# Patient Record
(demographics unavailable — no encounter records)

---

## 2025-03-27 NOTE — ASSESSMENT
[Good candidate] : a good candidate. We should proceed with our protocol for evaluation for kidney transplantation. [FreeTextEntry1] : good surgical candidate ct c/a/p with iv contrast small crc resected 2019; no adjuvant ctx required

## 2025-03-27 NOTE — PHYSICAL EXAM
[] : right dorsalis pedis palpable [TextBox_16] : sternotomy scar [TextBox_25] : soft nontender, laparoscopic incisions noted

## 2025-03-27 NOTE — HISTORY OF PRESENT ILLNESS
[TextBox_42] : Mr. CHEPE GODDARD is a 68 year M who presents today for initial/annual evaluation for kidney transplant candidacy  Cause of Kidney Disease: presumed DM2, never had biopsy Referring Nephrologist: Dr. Dickey DIALYSIS Hx: HD 10/2024 MWF, AVF, [id number], UF 2L per session, UOP- a lot, infections- denies, unit- [facility] Listing status: went for evaluation at [place] - but surgeon did not participate in his insurance Date of Last Visit: n/a ABO: O+ pRA: does not know  Most Recent Hospitalizations/Recent Events: 2024- woke up with difficulty breathing and started HD- at [facility] 2 weeks ago had ER visit for bleeding from fistula  MEDICAL Hx: DM 2 dx  on insulin denies retinopathy/neuropathy/gastroparesis/autonomic dysfunction/amputations/ulcers CAD s/p CABG 2019 at [facility] HLD TIA- around  HTN Colon cancer- 2019 s/p resection- found on routine colonscopy COVID/COVID vaccination: no infection, vaccine x 3  No H/o Gout No known h/o kidney stone/ Prostatism/urinary obstruction/ ureter stents/hematuria. No h/o Sleep apnea. No h/o Thyroid disease. No h/o PNA/h/o tuberculosis or hepatitis/active infections/open wounds. No h/o NSAID/pain medication use No known h/o active PVD/DVT/bleeding/falls/seizures/psych issues  Sensitizing Events: Transfusions- yes, during CABG Prior Transplant- no Genetic Testing- Renasight- none   SURGICAL Hx: CABG x  2019 Colon resection - [facility] AVF LUE No history of kidney/ bladder/ prostate surgery.  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  Potential Live donors: none

## 2025-03-28 NOTE — ASSESSMENT
[Good candidate] : a good candidate. We should proceed with our protocol for evaluation for kidney transplantation. [FreeTextEntry1] : Prior Studies: Cleared by- Cardiology- ECHO Stress Cath   Cancer Screen- Colonoscopy PSA 0.92 2/19/2025  Pap   Imaging- CT a/p CXR   Consultants: Primary MD- Dr. Radha Quiroga [phone] Cardiologist- Dr. Bonds  [phone]

## 2025-03-28 NOTE — ASSESSMENT
[Good candidate] : a good candidate. We should proceed with our protocol for evaluation for kidney transplantation. [FreeTextEntry1] : Prior Studies: Cleared by- Cardiology- ECHO Stress Cath   Cancer Screen- Colonoscopy PSA 0.92 2/19/2025  Pap   Imaging- CT a/p CXR   Consultants: Primary MD- Dr. Radha Quiroga [phone] Cardiologist- Dr. Bonds  [phone]

## 2025-03-28 NOTE — HISTORY OF PRESENT ILLNESS
[Diabetes Mellitus] : Diabetes Mellitus [Not Working] : Not working [70: Cares for self; unalbe to carry on normal activity or do active work.] : 70: Cares for self; unable to carry on normal activity or do active work. [TextBox_42] : Mr. CHEPE GODDARD is a 68 year M who presents today for initial/annual evaluation for kidney transplant candidacy   Cause of Kidney Disease: presumed DM2, never had biopsy Referring Nephrologist: Dr. Nikunj Dickey DIALYSIS Hx: HD 10/2024 MWF, AVF, [id number], UF 2L per session, UOP- a lot, infections- denies, unit- [facility] Listing status: went for evaluation at [place] 2025- but surgeon did not participate in his insurance Date of Last Visit: n/a ABO: O+ pRA: does not know   Most Recent Hospitalizations/Recent Events: 2024- woke up with difficulty breathing and started HD- at [facility] 2 weeks ago had ER visit for bleeding from fistula    MEDICAL Hx: DM 2 dx  on insulin denies retinopathy but noted to have neuropathy, no gastroparesis/autonomic dysfunction/amputations/ulcers CAD s/p CABG 2019 at [facility] HLD TIA- around  HTN Colon cancer- 2019 s/p resection- found on routine colonoscopy COVID/COVID vaccination: no infection, vaccine x 3  No H/o Gout No known h/o kidney stone/ Prostatism/urinary obstruction/ ureter stents/hematuria. No h/o Sleep apnea. No h/o Thyroid disease. No h/o PNA/h/o tuberculosis or hepatitis/active infections/open wounds. No h/o NSAID/pain medication use No known h/o active PVD/DVT/bleeding/falls/seizures/psych issues  Sensitizing Events: Transfusions- yes, during CABG Prior Transplant- no Genetic Testing- Renasit- none  SURGICAL Hx: CABG x  2019 Colon resection 2019- [facility] AVF LUE No history of kidney/ bladder/ prostate surgery.   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  Potential Live donors: none

## 2025-03-28 NOTE — PHYSICAL EXAM
Bedside report received from FLAQUITO LARA   Hemodialysis Treatment to be done @ bedside.  PER ICU STATUS AND ISOLATION COVID+ AND CDIFF  Name and  verified verbally by patient.   Bed weight is 70.5KG  Patient is on 3 Liters O2 via nasal cannula.  Patient is on ROOM AIR  Prescribed Hemodialysis orders are verified in the machine.  Hemodialysis orders are for  1 Liters of fluid removal.  --------------------------------------------------------    Dialysis location: ICU  Duration of Treatment (hrs) 4 Hours  Dialyzer F160  Dialysate Temperature (Centigrade) 35  Fluid Removal (L) 1L  BFR: 400  Tubing Choice Adult  Primary Access Site Central Line  K Dialysate: 3.0 meq  CA Dialysate: 2.50 meq  Glucose 100 mg/L  HCO3 Dialysate: 35    --------------------------------------------------------    Pre- Treatment VS:  BP 95/41   Temp 36.8C   Pulse 88   SpO2 96           Patient has a RIGHT SUBCLAVIAN CVC  Dressing is CLEAN DRY AND INTACT.   No visible S/S of infection noted.  Patient denies pain near insertion site.  No need for dressing change today, date on dressing is 2023    CVC accessed with aseptic technique.   Caps removed.  Arterial and venous lumens aspirated without difficulty.  Patency of both lumens checked with 2 (10) ml normal saline flushes.      Treatment initiated @ 0920  Scheduled completion @ 1320    Per Dr. Giancarlo RAMSAY to remove fluid as long as systolic is above 70    Treatment Completed @ 1320      Blood Retransfused. VSS.   Arterial and venous lumens flushed with 10 ml NS.  A Heparin dwell was administered.  1.6 ARTERIAL AND VENOUS LUMEN  Patient appears to be hemodynamically stable.  Patient is in no pain and denies complaints.  Post HD Report called to Bedside FLAQUITO LARA         BP 93/51   Temp 36.7c   Pulse 85   Resp 18    1.0 liters fluid removed        [General Appearance - In No Acute Distress] : in no acute distress [General Appearance - Alert] : alert [Sclera] : the sclera and conjunctiva were normal [PERRL With Normal Accommodation] : pupils were equal in size, round, and reactive to light [Extraocular Movements] : extraocular movements were intact [Outer Ear] : the ears and nose were normal in appearance [Hearing Threshold Finger Rub Not [place]] : hearing was normal [Examination Of The Oral Cavity] : the lips and gums were normal [Neck Appearance] : the appearance of the neck was normal [Neck Cervical Mass (___cm)] : no neck mass was observed [Jugular Venous Distention Increased] : there was no jugular-venous distention [Respiration, Rhythm And Depth] : normal respiratory rhythm and effort [Auscultation Breath Sounds / Voice Sounds] : lungs were clear to auscultation bilaterally [Heart Rate And Rhythm] : heart rate was normal and rhythm regular [Heart Sounds] : normal S1 and S2 [Heart Sounds Gallop] : no gallops [Murmurs] : no murmurs [Heart Sounds Pericardial Friction Rub] : no pericardial rub [Edema] : there was no peripheral edema [Bowel Sounds] : normal bowel sounds [Abdomen Soft] : soft [Abdomen Tenderness] : non-tender [Abnormal Walk] : normal gait [Nail Clubbing] : no clubbing  or cyanosis of the fingernails [Skin Lesions] : no skin lesions [] : right dorsalis pedis palpable [Cranial Nerves] : cranial nerves 2-12 were intact [No Focal Deficits] : no focal deficits [Oriented To Time, Place, And Person] : oriented to person, place, and time [Impaired Insight] : insight and judgment were intact [Affect] : the affect was normal [FreeTextEntry1] : toe nails with fungal changes, areas on toes with old wounds/scabs, very dry scaly skin

## 2025-03-28 NOTE — PHYSICAL EXAM
[General Appearance - Alert] : alert [General Appearance - In No Acute Distress] : in no acute distress [Sclera] : the sclera and conjunctiva were normal [PERRL With Normal Accommodation] : pupils were equal in size, round, and reactive to light [Extraocular Movements] : extraocular movements were intact [Outer Ear] : the ears and nose were normal in appearance [Hearing Threshold Finger Rub Not [place]] : hearing was normal [Examination Of The Oral Cavity] : the lips and gums were normal [Neck Appearance] : the appearance of the neck was normal [Neck Cervical Mass (___cm)] : no neck mass was observed [Jugular Venous Distention Increased] : there was no jugular-venous distention [Respiration, Rhythm And Depth] : normal respiratory rhythm and effort [Auscultation Breath Sounds / Voice Sounds] : lungs were clear to auscultation bilaterally [Heart Rate And Rhythm] : heart rate was normal and rhythm regular [Heart Sounds] : normal S1 and S2 [Heart Sounds Gallop] : no gallops [Murmurs] : no murmurs [Heart Sounds Pericardial Friction Rub] : no pericardial rub [Edema] : there was no peripheral edema [Bowel Sounds] : normal bowel sounds [Abdomen Soft] : soft [Abdomen Tenderness] : non-tender [Abnormal Walk] : normal gait [Nail Clubbing] : no clubbing  or cyanosis of the fingernails [Skin Lesions] : no skin lesions [] : right dorsalis pedis palpable [Cranial Nerves] : cranial nerves 2-12 were intact [No Focal Deficits] : no focal deficits [Oriented To Time, Place, And Person] : oriented to person, place, and time [Impaired Insight] : insight and judgment were intact [Affect] : the affect was normal [FreeTextEntry1] : toe nails with fungal changes, areas on toes with old wounds/scabs, very dry scaly skin

## 2025-03-28 NOTE — PLAN
[FreeTextEntry1] : 1) ESRD- patient is adjusting to recently starting dialysis- appears to be tolerating it for now 2) DM2- patient currently on insulin, needs to follow up with podiatry due to skin changes noted- discussed at length with hi, 3) CAD s/p CABG- multi vessel disease- needs to be cleared by cardiology, get prior records 4) Kidney transplant evaluation- patient is an acceptable candidate to continue work up for transplant. We discussed at length the benefits of living donation.  -podiatrist -dentist -needs updated colonoscopy and records of prior colon cancer  I have personally discussed the risks and benefits of transplantation and patient attended transplant education class where the following was disclosed:   Reviewed factors affecting survival and morbidity while on dialysis, the transplant wait list and reviewed valorie-operative and long-term risk factors affecting outcome in kidney transplantation.   One year SRTR outcomes for national and [facility] were discussed in regards to patient survival and graft survival after transplantation.   Details of transplant surgery, including complications were discussed. Details of slow and delayed graft function were discussed, referred to as "sleepy kidney." Outcomes of delayed graft function were also discussed. Immunosuppression and complications including infection including life threatening sepsis and opportunistic infections, malignancy and new onset diabetes were discussed.   Benefits of live donor transplantation as well as variability in wait times across regions and multiple listing were discussed. KDPI >85% and PHS high risk criteria donors were discussed. HCV kidney transplantation was discussed.   Patient and accompanying party verbalized understanding of all issues discussed. All questions were addressed.   Will proceed with completing/ updating work up and listing for transplant/ live donor transplant once work up is reviewed and found to be acceptable by multidisciplinary listing committee.

## 2025-03-28 NOTE — CONSULT LETTER
[Consult Letter:] : I had the pleasure of evaluating your patient, [unfilled]. [Please see my note below.] : Please see my note below. [Consult Closing:] : Thank you very much for allowing me to participate in the care of this patient.  If you have any questions, please do not hesitate to contact me. [Sincerely,] : Sincerely, [Dear  ___] : Dear  [unfilled], [FreeTextEntry3] : Aleida Bergeron MD MPH

## 2025-03-28 NOTE — PLAN
[FreeTextEntry1] : 1) ESRD- patient is adjusting to recently starting dialysis- appears to be tolerating it for now 2) DM2- patient currently on insulin, needs to follow up with podiatry due to skin changes noted- discussed at length with hi, 3) CAD s/p CABG- multi vessel disease- needs to be cleared by cardiology, get prior records 4) Kidney transplant evaluation- patient is an acceptable candidate to continue work up for transplant. We discussed at length the benefits of living donation.  -podiatrist -dentist -needs updated colonoscopy and records of prior colon cancer  I have personally discussed the risks and benefits of transplantation and patient attended transplant education class where the following was disclosed:   Reviewed factors affecting survival and morbidity while on dialysis, the transplant wait list and reviewed valorie-operative and long-term risk factors affecting outcome in kidney transplantation.   One year SRTR outcomes for national and [facility] were discussed in regards to patient survival and graft survival after transplantation.   Details of transplant surgery, including complications were discussed. Details of slow and delayed graft function were discussed, referred to as "sleepy kidney." Outcomes of delayed graft function were also discussed. Immunosuppression and complications including infection including life threatening sepsis and opportunistic infections, malignancy and new onset diabetes were discussed.   Benefits of live donor transplantation as well as variability in wait times across regions and multiple listing were discussed. KDPI >85% and PHS high risk criteria donors were discussed. HCV kidney transplantation was discussed.   Patient and accompanying party verbalized understanding of all issues discussed. All questions were addressed.   Will proceed with completing/ updating work up and listing for transplant/ live donor transplant once work up is reviewed and found to be acceptable by multidisciplinary listing committee.

## 2025-03-28 NOTE — HISTORY OF PRESENT ILLNESS
[Diabetes Mellitus] : Diabetes Mellitus [Not Working] : Not working [70: Cares for self; unalbe to carry on normal activity or do active work.] : 70: Cares for self; unable to carry on normal activity or do active work. [TextBox_42] : Mr. CHEPE GODDARD is a 68 year M who presents today for initial/annual evaluation for kidney transplant candidacy   Cause of Kidney Disease: presumed DM2, never had biopsy Referring Nephrologist: Dr. Nikunj Dickey DIALYSIS Hx: HD 10/2024 MWF, AVF, [id number], UF 2L per session, UOP- a lot, infections- denies, unit- [facility] Listing status: went for evaluation at [place] 2025- but surgeon did not participate in his insurance Date of Last Visit: n/a ABO: O+ pRA: does not know   Most Recent Hospitalizations/Recent Events: 2024- woke up with difficulty breathing and started HD- at [facility] 2 weeks ago had ER visit for bleeding from fistula    MEDICAL Hx: DM 2 dx  on insulin denies retinopathy but noted to have neuropathy, no gastroparesis/autonomic dysfunction/amputations/ulcers CAD s/p CABG 2019 at [facility] HLD TIA- around  HTN Colon cancer- 2019 s/p resection- found on routine colonoscopy COVID/COVID vaccination: no infection, vaccine x 3  No H/o Gout No known h/o kidney stone/ Prostatism/urinary obstruction/ ureter stents/hematuria. No h/o Sleep apnea. No h/o Thyroid disease. No h/o PNA/h/o tuberculosis or hepatitis/active infections/open wounds. No h/o NSAID/pain medication use No known h/o active PVD/DVT/bleeding/falls/seizures/psych issues  Sensitizing Events: Transfusions- yes, during CABG Prior Transplant- no Genetic Testing- Renasit- none  SURGICAL Hx: CABG x  2019 Colon resection 2019- [facility] AVF LUE No history of kidney/ bladder/ prostate surgery.   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  Potential Live donors: none

## 2025-04-03 NOTE — CARDIOLOGY SUMMARY
[de-identified] : 4/3/25: NSR, Inf q waves, nonspecific T wave abnormalities.  [de-identified] : 2-D Echocardiogram 12/19/24 [facility] 1. Left ventricle: The cavity size is normal. Mildly increased thickness is present. Regional wall motion abnormalities are present. The midd and basal inferior wall segments are hypokinetic. The mid and basal lateral wall segments are hypokinetic. The left ventricular ejection fraction is moderately reduced. The LVEF was calculated by 2D Izquierdo's method. Left ventricular ejection fraction is 35-40%.  2. Right ventricle: The cavity size is normal. Right ventricular systolic function is mildly reduced.  3. Left atrium: There is no evidence of a patent foramen ovale by color Doppler and agitated saline interrogation of the interatrial septum.  4. Mitral valve: The leaflets are mildly thickened. There is mild to moderate (1-2+) mitral valve regurgitation.  5. Aortic valve: The valve is probably trileaflet. The leaflets are mildly thickened. There is no aortic stenosis. There is no valvular aortic regurgitation.  6. Tricuspid valve: The leaflets are normal thickness. There is no tricuspid valve regurgitation.    2-D Echocardiogram 1/11/24 [facility] 1. Left ventricle: The cavity size is normal. Mildly increased thickness is present. Regional wall motion abnormalities are present. The LVEF was calculated by 2D Izquierdo's method. Left ventricular ejection fraction is 40-45%. Qualitatively the left ventricular ejection function appears to be mildly reduced. 2. Regional wall motion abnormality: Akinesis of the basal inferoseptal and basal inferior myocardium; hypokinesis of the basal-mid anterior and basal-mid anterolateral myocardium. 3. Right ventricle: The cavity size is normal. Right ventricular systolic function is normal. 4. Mitral valve: The leaflets are mildly calcified. There is moderate (2+) mitral valve regurgitation. 5. Aortic valve: The valve is trileaflet. The leaflets are mildly calcified. There is no aortic stenosis. There is no valvular aortic regurgitation. 6. Tricuspid valve: The tricuspid leaflets are not thickened. There is trace tricuspid valve regurgitation.  [de-identified] : Left Heart Cath 1/2024  Left ventricular function was not assessed.     Coronary Angiography  The coronary circulation is right dominant.     Left Main  Left main artery: There was no disease.     Left Anterior Descending  Mid left anterior descending: The vessel segment is supplied by a patent bypass graft. There is a 95 %  stenosis. First diagonal: There is a 90 % stenosis.     Circumflex  Mid circumflex: There is a 100 % stenosis.     Right Coronary  Mid right coronary artery: The vessel segment is supplied by collaterals. There is a 100 % stenosis.     Ramus  Ramus intermedius: The vessel segment is supplied by a patent bypass graft. There is a 100 % stenosis.     Graft Angiography  LIMA graft to Mid left anterior descending: Angiography shows no disease.   SVG graft to First obtuse marginal: Angiography shows no disease.   SVG graft to Ramus intermedius: Angiography shows no disease.   SVG graft to Right posterior descending artery: Angiography shows complete occlusion.

## 2025-04-03 NOTE — ASSESSMENT
[FreeTextEntry1] : Patient referred by the transplant center as patient is undergoing evaluation for possible kidney transplant.  Patient was previously followed by Dr Stein, he wants to switch over to [facility] to have all his physicians within the same network.    68 y.o. 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 Dover, s/p repeat coronary angiogram 1/2024 revealing occluded SVG to PDA, however patent LIMA to LAD, SVG to OM and ramus planned for medical management, CKD and sigmoid mass s/p laparoscopic sigmoid colon and low anterior resection, progressive CKD now on HD initiated 12/5/24 who was last admitted 12/17/24 for hypoxic respiratory failure secondary to acute HFrEF.   Patient denies chest pain, palpitations, FITCH, PND, orthopnea, leg edema, claudication, no syncope.    1. Ischemic cardiomyopathy/ASHD (arteriosclerotic heart disease)/S/P CABG x 5, 6/18/19/ Mixed hyperlipidemia NYHA class II, AHA stage C, EF 35-40% - continue antiplatelet and statin therapy - medical management of residual CAD - continue metoprolol succinate - no ACEi/ARB/ARNI or aldactone antagonist secondary to renal dysfunction   - continue ASA and statin therapy, goal LDL <70 - volume management with HD  - ESRD undergoing transplant evaluation  - Echocardiogram    2. Primary hypertension - continue present regimen    RTC post testing  I appreciate the opportunity of working with you in the care of CHEPE GODDARD . If I may be of additional assistance, please do not hesitate to contact me.

## 2025-04-03 NOTE — HISTORY OF PRESENT ILLNESS
[FreeTextEntry1] : Patient referred by the transplant center as patient is undergoing evaluation for possible kidney transplant.  Patient was previously followed by Dr Stein, he wants to switch over to [facility] to have all his physicians within the same network.    68 y.o. 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 Dover, s/p repeat coronary angiogram 1/2024 revealing occluded SVG to PDA, however patent LIMA to LAD, SVG to OM and ramus planned for medical management, CKD and sigmoid mass s/p laparoscopic sigmoid colon and low anterior resection, progressive CKD now on HD initiated 12/5/24 who was last admitted 12/17/24 for hypoxic respiratory failure secondary to acute HFrEF.   Patient denies chest pain, palpitations, FITCH, PND, orthopnea, leg edema, claudication, no syncope.

## 2025-04-17 NOTE — PHYSICAL EXAM
[General Appearance - Alert] : alert [General Appearance - In No Acute Distress] : in no acute distress [1+] : left foot dorsalis pedis 1+ [Oriented To Time, Place, And Person] : oriented to person, place, and time [Impaired Insight] : insight and judgment were intact [FreeTextEntry3] : Diminished but palpable pulses and skin atrophy [FreeTextEntry1] : Preulcerative callus lesion tip of right hallux no underlying wound Thickened mycotic nails x 10  [FreeTextEntry4] : Complete loss of proective snesation

## 2025-04-17 NOTE — HISTORY OF PRESENT ILLNESS
[Sneakers] : erick [FreeTextEntry1] : CHEPE is a 68-year-old M present in the [place] office for diabetic foot exam. Patient states he was diagnosed with diabetes in 1994. Patient states he is on dialysis for his Kidney. Patient states he needs a clearance for a kidney transplant. Patient denies any tingling, numbness and burning sensation. Patient states he has no concerns. Patient last visit with his pcp was last week.  Denies any claudication symptomns   FSB-125  A1c- 8.7%

## 2025-04-17 NOTE — ASSESSMENT
[FreeTextEntry1] : Diabetes with severe neuropathy Peripheral arterial disease  I then counseled the patient on performing self-examination of the feet on a daily basis. I explained the importance of this due to the diminished sensation and the greater susceptibility of getting an infection and having additional complications due to the medical condition that exists, especially if they lack protective sensation on their feet. I advised the patient to notify the office right away if increased redness, swelling, pain, open wounds or discharge were observed. I explained the importance of checking the glucose regularly and of keeping the glucose level between 90 -110 and more importantly controlling the HbA1C keeping consistent and trying to achieve as close to normal and HbA1C as possible around 6.0. I told the patient to notify the MD if the glucose level changed to above or below those levels. Advised to check feet daily and do not walk barefoot neuropathy nonpainful defer medical treatment Palpable pulses no sign necrosis no symptom of claudication. We discussed possible vascular referral in future Will continue to monitor  Educated on sign and symptoms of infection including redness, swelling, drainage, worsening pain. Constitutional symptoms such as fever, nausea, vomiting, chills. Advised to call the office immediately if noted  #Onychomycosis Discussed diagnosis and treatment with patient  Discussed etiology of symptoms patient is experiencing  Aseptic debridement of nails 1-5 bilateral reducing the length with a sterile nail clipper manually and reduced girth by power jaspreet  Discussed all risks, complications, and benefits of topical vs. oral anti-fungal therapy.  Candidate for debridement only Discussed proper shoe gear with patient  Discussed the importance of daily foot exams and pedal hygiene  #Preulcerative callus right hallux  Aseptic debridement of hyperkeratotic lesion performed with #15 blade tolerated well Discussed keeping area moisturized to prevent ulceration   PTR 2-3 months high risk foot care

## 2025-04-23 NOTE — ASSESSMENT
[FreeTextEntry1] : 68 year old male with EESRD, on dialysis MWF, presenting for colon cancer screening prior to being placed on the kidney transplant list. Personal history of colon polyps 5 years ago. He will be scheduled for a colonoscopy with Gavilyte prep for further evaluation. I have discussed the indications (including but not limited to ruling out inflammatory bowel disease, colorectal neoplasm, GI bleed, and AVM's), benefits, risks  (including but not limited to reaction to the anesthesia, infection, bleeding, missed lesions, and perforation),  and alternatives to colonoscopy with the patient. The patient understands all options and has agreed to have a colonoscopy and is medically optimized for the planned procedure.   Procedure to be done on either Tuesday or Thursday to not interfere with dialysis Cardiac clearance prior to procedure

## 2025-04-23 NOTE — REASON FOR VISIT
[Initial Evaluation] : an initial evaluation [FreeTextEntry1] : colon cancer screening, history of polyps, pre transplant procedure

## 2025-04-23 NOTE — PHYSICAL EXAM
[Alert] : alert [Normal Voice/Communication] : normal voice/communication [Healthy Appearing] : healthy appearing [No Acute Distress] : no acute distress [Sclera] : the sclera and conjunctiva were normal [Hearing Threshold Finger Rub Not [place]] : hearing was normal [Normal Lips/Gums] : the lips and gums were normal [Oropharynx] : the oropharynx was normal [Normal Appearance] : the appearance of the neck was normal [No Neck Mass] : no neck mass was observed [No Respiratory Distress] : no respiratory distress [No Acc Muscle Use] : no accessory muscle use [Respiration, Rhythm And Depth] : normal respiratory rhythm and effort [Auscultation Breath Sounds / Voice Sounds] : lungs were clear to auscultation bilaterally [Heart Rate And Rhythm] : heart rate was normal and rhythm regular [Normal S1, S2] : normal S1 and S2 [Murmurs] : no murmurs [Bowel Sounds] : normal bowel sounds [Abdomen Tenderness] : non-tender [No Masses] : no abdominal mass palpated [Abdomen Soft] : soft [] : no hepatosplenomegaly [Oriented To Time, Place, And Person] : oriented to person, place, and time

## 2025-04-23 NOTE — HISTORY OF PRESENT ILLNESS
[FreeTextEntry1] : CHEPE GODDARD is a 68 year old male with PMH of HTN, HLD, DM, ESRD on dialysis MWF, PAD, CABG x5, presenting today for colon cancer screening prior to being placed on the kidney transplant list. His last colonoscopy was 5 years ago at [facility] and he had a polyp removed. No family history of colon cancer or polyps. He feels well and offers no complaints. He denies abdominal pain, bowel changes, or rectal bleeding. He moves his bowels regularly. No upper GI complaints.

## 2025-07-17 NOTE — PHYSICAL EXAM
[General Appearance - Alert] : alert [General Appearance - In No Acute Distress] : in no acute distress [1+] : left foot dorsalis pedis 1+ [Oriented To Time, Place, And Person] : oriented to person, place, and time [Impaired Insight] : insight and judgment were intact [FreeTextEntry3] : Diminished but palpable pulses and skin atrophy [FreeTextEntry1] : Preulcerative callus lesion left 5th toe healed scabbing to toes 3/4 left foot no ulceration. No erythema/edema or clinical signs of infection  Thickened mycotic nails x 10  [FreeTextEntry4] : Complete loss of proective snesation

## 2025-07-17 NOTE — HISTORY OF PRESENT ILLNESS
[Sneakers] : erick [FreeTextEntry1] : CHEPE is a 68-year-old M present in the [place] office for diabetic foot exam. Patient states he was diagnosed with diabetes in 1994. Patient states he is on dialysis for his Kidney. Patient states he needs a clearance for a kidney transplant. Patient denies any tingling, numbness and burning sensation. Patient states he has no concerns. Patient last visit with his pcp was last week pt is requesting a close look to his feet today. Denies any claudication symptoms   FSB-93 A1c- 7% as per pt   Follow up 7/17. Has thickened nails with some discomfort denies any ulcerations. States dropped something on foot last week and noted lesion on third and forth toe no open lesions

## 2025-07-17 NOTE — ASSESSMENT
[FreeTextEntry1] : Diabetes with severe neuropathy Peripheral arterial disease  I then counseled the patient on performing self-examination of the feet on a daily basis. I explained the importance of this due to the diminished sensation and the greater susceptibility of getting an infection and having additional complications due to the medical condition that exists, especially if they lack protective sensation on their feet. I advised the patient to notify the office right away if increased redness, swelling, pain, open wounds or discharge were observed. I explained the importance of checking the glucose regularly and of keeping the glucose level between 90 -110 and more importantly controlling the HbA1C keeping consistent and trying to achieve as close to normal and HbA1C as possible around 6.0. I told the patient to notify the MD if the glucose level changed to above or below those levels. Advised to check feet daily and do not walk barefoot neuropathy nonpainful defer medical treatment Referral to vascular surgery evaluate for PAD  Educated on sign and symptoms of infection including redness, swelling, drainage, worsening pain. Constitutional symptoms such as fever, nausea, vomiting, chills. Advised to call the office immediately if noted  #Onychomycosis Discussed diagnosis and treatment with patient  Discussed etiology of symptoms patient is experiencing  Aseptic debridement of nails 1-5 bilateral reducing the length with a sterile nail clipper manually and reduced girth by power jaspreet  Discussed all risks, complications, and benefits of topical vs. oral anti-fungal therapy.  Candidate for debridement only Discussed proper shoe gear with patient  Discussed the importance of daily foot exams and pedal hygiene  #Preulcerative callus left 5th toe Aseptic debridement of hyperkeratotic lesion performed with #15 blade tolerated well Discussed keeping area moisturized to prevent ulceration  #Abrasion toe 3/4 left foot - discussed risk of nonhealing 2/2 to PAD  - Educated on sign and symptoms of infection including redness, swelling, drainage, worsening pain. Constitutional symptoms such as fever, nausea, vomiting, chills. Advised to call the office immediately if noted - no need to keep covered   PTR 2-3 months high risk foot care

## 2025-07-29 NOTE — CONSULT LETTER
[Dear  ___] : Dear  [unfilled], [Consult Letter:] : I had the pleasure of evaluating your patient, [unfilled]. [Please see my note below.] : Please see my note below. [Consult Closing:] : Thank you very much for allowing me to participate in the care of this patient.  If you have any questions, please do not hesitate to contact me. [Sincerely,] : Sincerely, [FreeTextEntry3] : Alan Segura MD FACP Diplomates American Board of Internal Medicine and Infectious Diseases [facility] [place]

## 2025-07-29 NOTE — ASSESSMENT
[FreeTextEntry1] :  Patient is in early stages of evaluation for renal transplantation.  He will be infectious disease cleared as 2 problems need to be addressed.  I believe his syphilis test is a biologic false positive but if inconclusive regardless of his history he may require 3 weekly injections of benzathine penicillin.  In addition he has a strongly positive QuantiFERON gold consistent with latent tuberculosis.  Patient was sent for chest x-ray will have the blood test repeated and will return next week for probable institution of rifampin for 4 months neither of these conditions will interfere with him being approved infectious disease wise for proceeding with renal transplantation or at least staying on the list Patient is concerned that there has been a delay in getting him fully evaluated I reassured him that there is nothing infectious disease wise that will preclude him from continuing or being added to the renal transplantation list.  He will follow-up next week and I anticipate 4 months of treatment with rifampin.  All issues regarding patient's health and medical problems have been discussed. The patient understands and concurs with the treatment plan.

## 2025-07-29 NOTE — HISTORY OF PRESENT ILLNESS
[Sexually Active] : The patient is sexually active [Monogamous] : monogamous [FreeTextEntry1] :  This patient is a 68-year-old male who is being evaluated for renal transplant.  Patient born in [facility] has been [place].  He states that he has never had an infectious disease in his least his PPDs have been negative although his serum QuantiFERON gold is positive.  In addition patient tested positive for syphilis which appears to be a biologic false positive.  He has been  for over 40 years and he has only had 1 sexual partner and the same for his wife.  He is not aware of ever being told he had a positive blood test which on screening was 1 out of 3 positive.  He has adequate antibodies for hepatitis B which appears to be postvaccination.  Patient has positive antibodies to hepatitis A.  Although the lab test for infectious diseases are unremarkable except for the above mentioned.  He has not had a chest x-ray in over a year which has to be done he is asymptomatic otherwise he tolerates hemodialysis without difficulty He currently is undergoing dental work and implants without any active infection Although not documented in the chart he has had pneumococcal vaccination within the past 2 years as well as Shingrix vaccination 2 years ago.  He is not up-to-date with Tdap  [de-identified] : one sexual partner all life no std

## 2025-07-29 NOTE — CONSULT LETTER
[Dear  ___] : Dear  [unfilled], [Consult Letter:] : I had the pleasure of evaluating your patient, [unfilled]. [Please see my note below.] : Please see my note below. [Consult Closing:] : Thank you very much for allowing me to participate in the care of this patient.  If you have any questions, please do not hesitate to contact me. [Sincerely,] : Sincerely, [FreeTextEntry3] : Alan Segura MD FACP Diplomates American Board of Internal Medicine and Infectious Diseases [facility] [place]

## 2025-07-29 NOTE — HISTORY OF PRESENT ILLNESS
[Sexually Active] : The patient is sexually active [Monogamous] : monogamous [FreeTextEntry1] :  This patient is a 68-year-old male who is being evaluated for renal transplant.  Patient born in [facility] has been [place].  He states that he has never had an infectious disease in his least his PPDs have been negative although his serum QuantiFERON gold is positive.  In addition patient tested positive for syphilis which appears to be a biologic false positive.  He has been  for over 40 years and he has only had 1 sexual partner and the same for his wife.  He is not aware of ever being told he had a positive blood test which on screening was 1 out of 3 positive.  He has adequate antibodies for hepatitis B which appears to be postvaccination.  Patient has positive antibodies to hepatitis A.  Although the lab test for infectious diseases are unremarkable except for the above mentioned.  He has not had a chest x-ray in over a year which has to be done he is asymptomatic otherwise he tolerates hemodialysis without difficulty He currently is undergoing dental work and implants without any active infection Although not documented in the chart he has had pneumococcal vaccination within the past 2 years as well as Shingrix vaccination 2 years ago.  He is not up-to-date with Tdap  [de-identified] : one sexual partner all life no std